# Patient Record
Sex: MALE | Race: WHITE
[De-identification: names, ages, dates, MRNs, and addresses within clinical notes are randomized per-mention and may not be internally consistent; named-entity substitution may affect disease eponyms.]

---

## 2018-04-06 ENCOUNTER — HOSPITAL ENCOUNTER (EMERGENCY)
Dept: HOSPITAL 17 - PHED | Age: 63
Discharge: HOME | End: 2018-04-06
Payer: OTHER GOVERNMENT

## 2018-04-06 VITALS
RESPIRATION RATE: 18 BRPM | HEART RATE: 58 BPM | DIASTOLIC BLOOD PRESSURE: 64 MMHG | SYSTOLIC BLOOD PRESSURE: 135 MMHG | OXYGEN SATURATION: 100 % | TEMPERATURE: 97.2 F

## 2018-04-06 VITALS — HEIGHT: 71 IN | WEIGHT: 242.51 LBS | BODY MASS INDEX: 33.95 KG/M2

## 2018-04-06 DIAGNOSIS — Z79.82: ICD-10-CM

## 2018-04-06 DIAGNOSIS — M54.31: Primary | ICD-10-CM

## 2018-04-06 DIAGNOSIS — I10: ICD-10-CM

## 2018-04-06 DIAGNOSIS — Z72.0: ICD-10-CM

## 2018-04-06 PROCEDURE — 99283 EMERGENCY DEPT VISIT LOW MDM: CPT

## 2018-04-06 NOTE — PD
HPI


Chief Complaint:  Pain: Acute or Chronic


Time Seen by Provider:  08:51


Travel History


International Travel<30 days:  No


Contact w/Intl Traveler<30days:  No


Traveled to known affect area:  No





History of Present Illness


HPI


This 63-year-old male is complaining of pain the right sciatic area.  He has a 

history of sciatica and has had attacks like this before which have responded 

well to Flexeril and pain medication.  He has been having an attack for a 

couple of days.  He has been taking a lot of ibuprofen without much response.  

This is similar to previous episodes.  He has had x-rays in the past.





PFSH


Past Medical History


Hx Anticoagulant Therapy:  Yes (BABY ASA DAILY)


Cardiovascular Problems:  Yes (HTN, CHOL)


High Cholesterol:  Yes


Diabetes:  No


Diminished Hearing:  No


Hypertension:  Yes


Tetanus Vaccination:  Unknown


Influenza Vaccination:  Yes





Past Surgical History


Cholecystectomy:  Yes





Social History


Alcohol Use:  Yes (occ)


Tobacco Use:  Yes (2 cig per day)


Substance Use:  No





Allergies-Medications


(Allergen,Severity, Reaction):  


Coded Allergies:  


     No Known Allergies (Unverified , 4/6/18)


Reported Meds & Prescriptions





Reported Meds & Active Scripts


Active


Reported


[blood pressure]   1 Tab PO DAILY


[cholesterol med]   1 Tab PO DAILY


Aspir-81 (Aspirin) 81 Mg Tabdr 1 Tab PO DAILY








Review of Systems


General / Constitutional:  No: Fever, Chills


Eyes:  No: Diploplia


HENT:  No: Headaches, Vertigo


Cardiovascular:  No: Chest Pain or Discomfort, Palpitations


Gastrointestinal:  No: Nausea, Vomiting


Genitourinary:  No: Urgency


Musculoskeletal:  Positive: Pain


Neurologic:  No: Weakness, Dizziness


Hematologic/Lymphatic:  No: Easy Bruising





Physical Exam


Narrative


GENERAL: Well-developed male


SKIN: Focused skin assessment warm/dry.


HEAD: Atraumatic. Normocephalic. 


EYES: Pupils equal and round. No scleral icterus. No injection or drainage. 


ENT: No nasal bleeding or discharge.  Mucous membranes pink and moist.


NECK: Trachea midline. No JVD. 


MUSCULOSKELETAL: No obvious deformities. No clubbing.  No cyanosis.  No edema.  

He has some tenderness in the right sciatic notch.  He has pain with straight 

leg raising on the right.  He has good strength in plantar dorsiflexion.  

Sensation


NEUROLOGICAL: Awake and alert. No obvious cranial nerve deficits.  Motor 

grossly within normal limits. Normal speech.


PSYCHIATRIC: Appropriate mood and affect; insight and judgment normal.





Data


Data


Last Documented VS





Vital Signs








  Date Time  Temp Pulse Resp B/P (MAP) Pulse Ox O2 Delivery O2 Flow Rate FiO2


 


4/6/18 08:06 97.2 58 18 135/64 (87) 100   











MDM


Medical Decision Making


Medical Screen Exam Complete:  Yes


Emergency Medical Condition:  Yes


Medical Record Reviewed:  Yes


Differential Diagnosis


Differential includes sciatica, back pain exacerbation


Narrative Course


Patient is responded well in the past with Flexeril and pain medication.  I 

will write prescriptions and he will be released





Diagnosis





 Primary Impression:  


 Sciatica


Scripts


Hydrocodone-Acetaminophen (Norco)  Mg Tab


1 TAB PO Q4H Y for PAIN, #10 TAB 0 Refills


   Prov: Toney Munguia MD         4/6/18 


Cyclobenzaprine (Flexeril) 10 Mg Tab


10 MG PO TID for Muscle Spasm, #30 TAB 0 Refills


   Prov: Toney Munguia MD         4/6/18


Disposition:  01 DISCHARGE HOME


Condition:  Stable











Toney Munguia MD Apr 6, 2018 09:02

## 2021-11-04 ENCOUNTER — HOSPITAL ENCOUNTER (OUTPATIENT)
Dept: SLEEP MEDICINE | Age: 66
Discharge: HOME | End: 2021-11-04
Payer: OTHER GOVERNMENT

## 2021-11-04 PROCEDURE — 95810 POLYSOM 6/> YRS 4/> PARAM: CPT

## 2023-02-13 ENCOUNTER — HOSPITAL ENCOUNTER (OUTPATIENT)
Dept: CT IMAGING | Age: 68
Discharge: HOME | End: 2023-02-16
Payer: MEDICARE

## 2023-02-13 DIAGNOSIS — Z87.891 PERSONAL HISTORY OF TOBACCO USE, PRESENTING HAZARDS TO HEALTH: ICD-10-CM

## 2023-02-13 PROCEDURE — 71271 CT THORAX LUNG CANCER SCR C-: CPT

## 2024-02-22 ENCOUNTER — HOSPITAL ENCOUNTER (OUTPATIENT)
Dept: CT IMAGING | Age: 69
Discharge: HOME | End: 2024-02-22
Attending: INTERNAL MEDICINE
Payer: MEDICARE

## 2024-02-22 DIAGNOSIS — F17.211 CIGARETTE NICOTINE DEPENDENCE IN REMISSION: ICD-10-CM

## 2024-02-22 DIAGNOSIS — Z87.891 PERSONAL HISTORY OF TOBACCO USE, PRESENTING HAZARDS TO HEALTH: ICD-10-CM

## 2024-02-22 PROCEDURE — 71271 CT THORAX LUNG CANCER SCR C-: CPT

## 2025-04-09 ENCOUNTER — HOSPITAL ENCOUNTER (OUTPATIENT)
Dept: CT IMAGING | Age: 70
Discharge: HOME | End: 2025-04-12
Attending: INTERNAL MEDICINE
Payer: OTHER GOVERNMENT

## 2025-04-09 DIAGNOSIS — Z87.891 PERSONAL HISTORY OF TOBACCO USE, PRESENTING HAZARDS TO HEALTH: ICD-10-CM

## 2025-04-09 PROCEDURE — 71271 CT THORAX LUNG CANCER SCR C-: CPT
